# Patient Record
(demographics unavailable — no encounter records)

---

## 2025-07-09 NOTE — REASON FOR VISIT
[FreeTextEntry1] : 87 y/o F with chronic fatigue, h/o SVT and PAF previously followed by Dr Lorin Oneil.  A Fib occurred at time of emergency surgery.  Under a lot of stress, friends have .  Her dog  .  Has a new dog.   Recently went to  for heart Sxs.  She noted nocturnal  palps. They have since resolved.  She walks without CP.  Talks frequently about a lack of friends.  Has no children. She still works  25 In May 2024 given estrogen by GYN, dayna issues after that, vaginal pain, nocturnal incontinence, became homebound. Covid Dec 2024. Now with diarrhea. onset of arthritic pain in knees and hips.  Ortho started her on PT. Fired as  of [employer].  Sister  and a relative committed suicide in a dispute with the pts brother over a will.  Less friends around.  EKG: NSR with low voltage, RSR' in V1, ST-Twave abnormalities.

## 2025-07-09 NOTE — ASSESSMENT
[FreeTextEntry1] : PAF- denies recurrence  PSVT- no sustained tachycardia.  Palps- Offered a ZIO monitor, she defers.  Will observe for now, exam is WNL.  EKG with low voltage but not new. This was present in 2018.  Echo deferred.  Given reassurance as anxiety plays a large role in causing symptoms.  Anxiety and Depression- tried Trazodone and another med, given Gemtesa, could not tolerate. Trying hypnosis.